# Patient Record
Sex: MALE | Race: WHITE | NOT HISPANIC OR LATINO | Employment: FULL TIME | URBAN - METROPOLITAN AREA
[De-identification: names, ages, dates, MRNs, and addresses within clinical notes are randomized per-mention and may not be internally consistent; named-entity substitution may affect disease eponyms.]

---

## 2024-04-04 ENCOUNTER — OFFICE VISIT (OUTPATIENT)
Dept: URGENT CARE | Facility: CLINIC | Age: 29
End: 2024-04-04
Payer: COMMERCIAL

## 2024-04-04 VITALS
OXYGEN SATURATION: 98 % | BODY MASS INDEX: 31.78 KG/M2 | HEART RATE: 73 BPM | WEIGHT: 227 LBS | SYSTOLIC BLOOD PRESSURE: 138 MMHG | DIASTOLIC BLOOD PRESSURE: 75 MMHG | HEIGHT: 71 IN | TEMPERATURE: 98 F | RESPIRATION RATE: 18 BRPM

## 2024-04-04 DIAGNOSIS — J06.9 VIRAL URI WITH COUGH: Primary | ICD-10-CM

## 2024-04-04 DIAGNOSIS — J02.9 SORE THROAT: ICD-10-CM

## 2024-04-04 DIAGNOSIS — R09.81 NASAL CONGESTION: ICD-10-CM

## 2024-04-04 PROCEDURE — 99204 OFFICE O/P NEW MOD 45 MIN: CPT | Mod: S$GLB,,, | Performed by: NURSE PRACTITIONER

## 2024-04-04 RX ORDER — BENZONATATE 200 MG/1
200 CAPSULE ORAL 3 TIMES DAILY PRN
Qty: 30 CAPSULE | Refills: 0 | Status: SHIPPED | OUTPATIENT
Start: 2024-04-04 | End: 2024-04-14

## 2024-04-04 RX ORDER — PROMETHAZINE HYDROCHLORIDE AND DEXTROMETHORPHAN HYDROBROMIDE 6.25; 15 MG/5ML; MG/5ML
5 SYRUP ORAL EVERY 6 HOURS PRN
Qty: 118 ML | Refills: 0 | Status: SHIPPED | OUTPATIENT
Start: 2024-04-04 | End: 2024-04-11

## 2024-04-04 NOTE — PROGRESS NOTES
"Subjective:      Patient ID: Varun Chapa is a 28 y.o. male.    Vitals:  height is 5' 11" (1.803 m) and weight is 103 kg (227 lb). His oral temperature is 98.2 °F (36.8 °C). His blood pressure is 138/75 and his pulse is 73. His respiration is 18 and oxygen saturation is 98%.     Chief Complaint: Nasal Congestion    28 year old male presents for evaluation of cough, nasal congestion, fever (T Max 99.8), lightheadedness, and fatigue onset last Tuesday. OTC cold/flu medications without relief and Promethazine DM with cough relief. Positive sick contact at work with similar symptoms, daughter with symptoms this morning (-)Covid/Flu/RSV      Constitution: Positive for appetite change, fatigue and fever. Negative for chills and sweating.   HENT:  Positive for congestion and sore throat (irritated/sore from cough). Negative for ear pain, postnasal drip, sinus pain and sinus pressure.    Neck: neck negative.   Cardiovascular: Negative.    Eyes: Negative.    Respiratory:  Positive for cough and sputum production. Negative for shortness of breath and wheezing.    Gastrointestinal: Negative.  Negative for nausea, vomiting and diarrhea.   Endocrine: negative.   Genitourinary: Negative.    Musculoskeletal:  Positive for back pain (upper back with cough. history of lower back pain/disc issue) and muscle ache.   Skin: Negative.    Allergic/Immunologic: Negative.  Negative for sneezing.   Neurological:  Positive for headaches.   Hematologic/Lymphatic: Negative.    Psychiatric/Behavioral: Negative.        Objective:     Physical Exam   Constitutional: He is oriented to person, place, and time. He appears well-developed. He is cooperative.  Non-toxic appearance. He does not appear ill. No distress. normalawake  HENT:   Head: Normocephalic and atraumatic.   Ears:   Right Ear: Hearing, tympanic membrane, external ear and ear canal normal. Tympanic membrane is not injected, not scarred, not perforated, not erythematous, not " retracted and not bulging. no impacted cerumen  Left Ear: Hearing, tympanic membrane, external ear and ear canal normal. Tympanic membrane is not injected, not scarred, not perforated, not erythematous, not retracted and not bulging. no impacted cerumen  Nose: Nose normal. No mucosal edema, rhinorrhea or nasal deformity. No epistaxis. Right sinus exhibits no maxillary sinus tenderness and no frontal sinus tenderness. Left sinus exhibits no maxillary sinus tenderness and no frontal sinus tenderness.   Mouth/Throat: Uvula is midline and mucous membranes are normal. Mucous membranes are moist. No trismus in the jaw. Normal dentition. No uvula swelling. Posterior oropharyngeal erythema present. No oropharyngeal exudate or posterior oropharyngeal edema. Tonsils are 0 on the right. Tonsils are 0 on the left. No tonsillar exudate.   Eyes: Conjunctivae and lids are normal. Pupils are equal, round, and reactive to light. Right eye exhibits no discharge. Left eye exhibits no discharge. No scleral icterus. Extraocular movement intact   Neck: Trachea normal and phonation normal. Neck supple. No edema present. No erythema present. No neck rigidity present.   Cardiovascular: Normal rate, regular rhythm, normal heart sounds and normal pulses.   Pulmonary/Chest: Effort normal and breath sounds normal. No stridor. No respiratory distress. He has no decreased breath sounds. He has no wheezes. He has no rhonchi. He has no rales. He exhibits no tenderness.   Abdominal: Normal appearance.   Musculoskeletal: Normal range of motion.         General: No deformity. Normal range of motion.   Neurological: no focal deficit. He is alert and oriented to person, place, and time. He exhibits normal muscle tone. Coordination normal.   Skin: Skin is warm, dry, intact, not diaphoretic and not pale.   Psychiatric: His speech is normal and behavior is normal. Mood, judgment and thought content normal.   Nursing note and vitals  reviewed.      Assessment:     1. Viral URI with cough    2. Sore throat    3. Nasal congestion        Plan:       Viral URI with cough  -     benzonatate (TESSALON) 200 MG capsule; Take 1 capsule (200 mg total) by mouth 3 (three) times daily as needed for Cough (cough).  Dispense: 30 capsule; Refill: 0  -     promethazine-dextromethorphan (PROMETHAZINE-DM) 6.25-15 mg/5 mL Syrp; Take 5 mLs by mouth every 6 (six) hours as needed (cough).  Dispense: 118 mL; Refill: 0    Sore throat    Nasal congestion        Patient presents with symptoms that are consistent with acute viral URI. Swabs deferred due to duration of illness. Exam negative for otitis media/bacterial sinusitis/bacterial tonsillitis/bronchitis/pneumonia. Plan is to manage symptoms, prevent worsening, and follow up as needed/with worsening. Discussed with patient who verbalizes understanding.     Patient Instructions   Rest  Hydration/increase fluids  Steam/hot showers  Warm salt water gargles  Warm tea with lemon and honey  Vitamin C and Zinc OTC as directed for immune support  Claritin D as directed for stuffy nose/post nasal drip  Tessalon Perles 3 times daily as needed for cough  Phenergan DM every 6 hours as needed for cough (Caution drowsiness)  Typical course and duration of illness discussed  Signs and symptoms of worsening discussed  Follow up as needed/with worsening

## 2024-04-04 NOTE — PATIENT INSTRUCTIONS
Rest  Hydration/increase fluids  Steam/hot showers  Warm salt water gargles  Warm tea with lemon and honey  Vitamin C and Zinc OTC as directed for immune support  Claritin D as directed for stuffy nose/post nasal drip  Tessalon Perles 3 times daily as needed for cough  Phenergan DM every 6 hours as needed for cough (Caution drowsiness)  Typical course and duration of illness discussed  Signs and symptoms of worsening discussed  Follow up as needed/with worsening